# Patient Record
Sex: FEMALE | Race: WHITE | NOT HISPANIC OR LATINO
[De-identification: names, ages, dates, MRNs, and addresses within clinical notes are randomized per-mention and may not be internally consistent; named-entity substitution may affect disease eponyms.]

---

## 2021-12-07 PROBLEM — Z00.00 ENCOUNTER FOR PREVENTIVE HEALTH EXAMINATION: Status: ACTIVE | Noted: 2021-12-07

## 2022-01-03 ENCOUNTER — APPOINTMENT (OUTPATIENT)
Dept: GYNECOLOGIC ONCOLOGY | Facility: CLINIC | Age: 82
End: 2022-01-03
Payer: MEDICARE

## 2022-01-03 VITALS
WEIGHT: 122 LBS | TEMPERATURE: 97.2 F | SYSTOLIC BLOOD PRESSURE: 145 MMHG | BODY MASS INDEX: 20.58 KG/M2 | HEART RATE: 82 BPM | RESPIRATION RATE: 18 BRPM | DIASTOLIC BLOOD PRESSURE: 86 MMHG | HEIGHT: 64.5 IN | OXYGEN SATURATION: 94 %

## 2022-01-03 DIAGNOSIS — N94.89 OTHER SPECIFIED CONDITIONS ASSOCIATED WITH FEMALE GENITAL ORGANS AND MENSTRUAL CYCLE: ICD-10-CM

## 2022-01-03 DIAGNOSIS — Z80.42 FAMILY HISTORY OF MALIGNANT NEOPLASM OF PROSTATE: ICD-10-CM

## 2022-01-03 DIAGNOSIS — Z87.39 PERSONAL HISTORY OF OTHER DISEASES OF THE MUSCULOSKELETAL SYSTEM AND CONNECTIVE TISSUE: ICD-10-CM

## 2022-01-03 DIAGNOSIS — Z85.828 PERSONAL HISTORY OF OTHER MALIGNANT NEOPLASM OF SKIN: ICD-10-CM

## 2022-01-03 DIAGNOSIS — Z86.79 PERSONAL HISTORY OF OTHER DISEASES OF THE CIRCULATORY SYSTEM: ICD-10-CM

## 2022-01-03 DIAGNOSIS — N28.89 OTHER SPECIFIED DISORDERS OF KIDNEY AND URETER: ICD-10-CM

## 2022-01-03 PROCEDURE — 99204 OFFICE O/P NEW MOD 45 MIN: CPT

## 2022-01-03 RX ORDER — ZOLEDRONIC ACID 5 MG/100ML
INJECTION, SOLUTION INTRAVENOUS
Refills: 0 | Status: ACTIVE | COMMUNITY

## 2022-01-03 RX ORDER — APIXABAN 2.5 MG/1
2.5 TABLET, FILM COATED ORAL
Refills: 0 | Status: ACTIVE | COMMUNITY

## 2022-01-03 RX ORDER — ESCITALOPRAM OXALATE 10 MG/1
10 TABLET, FILM COATED ORAL
Refills: 0 | Status: ACTIVE | COMMUNITY

## 2022-01-03 NOTE — DISCUSSION/SUMMARY
[Reviewed Clinical Lab Test(s)] : Results of clinical tests were reviewed. [Reviewed Radiology Report(s)] : Radiology reports were reviewed. [Discuss Alternatives/Risks/Benefits w/Patient] : All alternatives, risks, and benefits were discussed with the patient/family and all questions were answered.  Patient expressed good understanding and appreciates the importance of follow up as recommended. [FreeTextEntry1] : 82 yo referred by Gyn Dr. Sloan for incidental finding of 4 cm L adnexal mass\par \par [ ] tumor markers today - Ca-125, CEA, Ca 19-9 \par [ ] ordering CT A/P \par [ ] Plan to monitor cyst, possible repeat TVUS in 3 months pending CT scan

## 2022-01-03 NOTE — HISTORY OF PRESENT ILLNESS
[FreeTextEntry1] : Problem\par 1) Adnexal mass\par \par Previous Therapy\par 1) MRI Abdomen with and without contrast 21\par    a) partially imaged 4.1x2.1cm oval well circumscribed L adenxal mass - may represent leiomyoma or an endometrioma\par 2) Pelvic US 10/25/21\par    a) L ovarian with complex hypoechoic mass 4.2cm with a round hyperechoic intramural structure 1cm\par \par 82 yo referred by Gyn Dr. Sloan for 4 cm L adnexal mass - dx includes leiomyoma versus endometrioma. Patient is asymptomatic, mass found incidentally on MRI 2021 done for renal mass. Per patient renal mass will be re-imaged, has been known for 5 years, appears benign. Denies any pelvic pain, vaginal bleeding, bloating, weight loss or gain. \par \par POB:  x2 \par PGYN: Denies hx of abnl pap smears, last many years ago.  Denies previous hx of ovarian cysts or fibroids. Menses began at age 10. Menopause at age 50. \par PMH: AFib s/p 3 ablations- told by cardiologist she still goes into Afib but cannot feel it, basal cell carcinoma R leg, anxiety, osteoporosis \par PSH: 3 cardiac ablations, right 5th digit sx, basal cell carcinoma excision \par Meds: Elliquis 2.5 mg BID, tramadol prn, escitalopram 10mg, ativan prn, reclast \par Allergies: color additives- mouth swelling and gum bleeding, has difficulty taking many mediations\par Fam Hx: father prostate cancer   \par Social Hx: denies alcohol, tobacco use. Lives at home alone, no stairs.   \par \par HCM:  \par Pap: last many years ago, always normal per patient \par Mammogram: last 2 years ago, always normal per patient \par Colonscopy: last 10 years, normal per patient   \par DXA: had 5 years ago- dx with osteoporosis

## 2022-01-03 NOTE — OB HISTORY
[Total Preg ___] : : [unfilled] [Full Term ___] : [unfilled] (full-term) [Vaginal ___] : [unfilled] vaginal delivery(s) [Menarche Age ____] : age at menarche was [unfilled] [Menopause  Age ____] : menopause occurred at age [unfilled]

## 2022-01-03 NOTE — PHYSICAL EXAM
[Chaperone Present] : A chaperone was present in the examining room during all aspects of the physical examination [Normal] : Recto-Vaginal Exam: Normal [Abnormal] : Adnexa(ae): Abnormal [Uterine Adnexae] : normal left adnexa [Adnexa Tenderness On The Left] : nontender on the left [___] : a [unfilled] ~Ucm left mass [FreeTextEntry1] : mobile, smooth ovarian mass on the left side [Fully active, able to carry on all pre-disease performance without restriction] : Status 0 - Fully active, able to carry on all pre-disease performance without restriction

## 2022-01-04 LAB
CANCER AG125 SERPL-ACNC: 15 U/ML
CANCER AG19-9 SERPL-ACNC: 13 U/ML
CEA SERPL-MCNC: 3 NG/ML

## 2022-01-18 ENCOUNTER — TRANSCRIPTION ENCOUNTER (OUTPATIENT)
Age: 82
End: 2022-01-18

## 2023-05-05 ENCOUNTER — RESULT REVIEW (OUTPATIENT)
Age: 83
End: 2023-05-05

## 2023-05-05 ENCOUNTER — APPOINTMENT (OUTPATIENT)
Dept: ORTHOPEDIC SURGERY | Facility: CLINIC | Age: 83
End: 2023-05-05
Payer: MEDICARE

## 2023-05-05 VITALS — WEIGHT: 124 LBS | BODY MASS INDEX: 20.91 KG/M2 | HEIGHT: 64.5 IN

## 2023-05-05 DIAGNOSIS — M17.0 BILATERAL PRIMARY OSTEOARTHRITIS OF KNEE: ICD-10-CM

## 2023-05-05 PROCEDURE — J3490M: CUSTOM | Mod: NC

## 2023-05-05 PROCEDURE — 20610 DRAIN/INJ JOINT/BURSA W/O US: CPT | Mod: 50

## 2023-05-05 NOTE — HISTORY OF PRESENT ILLNESS
[de-identified] : fell on knees 3 months ago\par occasional nsaids\par pain w stairs and getting up

## 2023-05-05 NOTE — PHYSICAL EXAM
[de-identified] : no effusion\par rom 0-125\par no pain w hip rom\par no instability\par nvid [de-identified] : mod OA both knees

## 2023-05-05 NOTE — DISCUSSION/SUMMARY
[de-identified] : arthritis exacerbation\par cortisone given both knees\par modalities reviewed\par fu when pain returns if soon will try visco

## 2023-05-05 NOTE — PROCEDURE
[de-identified] : patients wants a cortisone injection.  risks, benefits, and alternatives discussed and patient gave consent.  under sterile conditions using the anterolateral portal i injected 80mg of depomedrol and 2cc of 0.25% marcaine.  patient tolerated procedure well and will rest and ice and fu when injection wears off. \par \par both knees

## 2023-09-05 ENCOUNTER — NON-APPOINTMENT (OUTPATIENT)
Age: 83
End: 2023-09-05

## 2023-09-05 ENCOUNTER — APPOINTMENT (OUTPATIENT)
Dept: PAIN MANAGEMENT | Facility: CLINIC | Age: 83
End: 2023-09-05
Payer: MEDICARE

## 2023-09-05 VITALS
WEIGHT: 124 LBS | HEART RATE: 68 BPM | DIASTOLIC BLOOD PRESSURE: 83 MMHG | SYSTOLIC BLOOD PRESSURE: 145 MMHG | HEIGHT: 64.5 IN | OXYGEN SATURATION: 98 % | BODY MASS INDEX: 20.91 KG/M2

## 2023-09-05 PROCEDURE — 99203 OFFICE O/P NEW LOW 30 MIN: CPT

## 2023-09-05 RX ORDER — DICLOFENAC SODIUM 1% 10 MG/G
1 GEL TOPICAL
Qty: 1 | Refills: 1 | Status: ACTIVE | COMMUNITY
Start: 2023-09-05 | End: 1900-01-01

## 2023-09-05 NOTE — PHYSICAL EXAM
[de-identified] : General: Well-developed and well-nourished.  No acute distress. Psychiatric: Behavior was cooperative   Head:  Normocephalic and atraumatic Eyes:  Sclera white. Conjunctiva and eyelids pink and moist without discharge. Cardiovascular:  Regular Respiratory:  Trachea midline. Normal effort. No accessory muscle use with respiration Abdomen: Non distended, soft and nontender Skin:  No rashes, ulcers, or lesions appreciated.  Back  There is no pain with extension,   ROM wnl  Knee Joint Exam  ROM limited in flexion due to pain Tenderness at medial joint space, bilaterally  Anterior Drawer Test:  neg Lachman Test: neg Luis Manuel Test: neg Posterior Drawer Test neg:  [place] (Patellar Grind) Test:  POSITIVE  Comments:    Extremities: No edema  Musculoskeletal: Moving all extremities freely  Neuro: CN 2-12 Grossly intact Sensation to light touch is intact in all extremities Gait: Ambulates with no assistive device.

## 2023-09-05 NOTE — ASSESSMENT
[FreeTextEntry1] : Ms. JAZMYN WALKER is a 83 year PMHx as above, AFIB on ELIQUIS F suffering from bilateral knee pain, that based upon today's subjective complaints, physical examination, and XRAY review, is likely secondary to knee osteoarthritis  >> Medications  Chronic opioid use for non-malignant pain, in particular at high doses would not be recommended since it can potentially lead to hyperalgesia (hypersensitivity), tolerance and addiction.   Voltaren Gel PRN     >> Interventions   Arrange for bilateral knee viscosupplementation injections with Synvisc ONE under ULTRASOUND. I have discussed in detail with the patient that an interventional pain procedure is associated with potential risks.     >> Therapy and Other Modalities   PT 2x/week for 4 weeks for knee pain 2/2 chronic OA. Lower limb strengthening joseph. vastus medialis, hip abductors and hip external rotators, Closed chain, especially wall slides/squeezing therapy ball for VMO strengthening ,ROM/stretching- especially TFL/ITB,mobilization of lateral patellar retinaculum,  taping to correct patellar malalignment, joint protection techniques,Modalities as needed  >> Imaging and Other Studies  I have personally reviewed the images in detail together with the patient today, and I have answered all questions regarding this condition to the best of my ability, to the patient's satisfaction.  >> Consults  None ordered

## 2023-09-05 NOTE — REVIEW OF SYSTEMS
[Decreased ROM] : decreased range of motion [Weakness] : weakness [Negative] : Heme/Lymph [Joint Pain] : joint pain [FreeTextEntry9] : Both Knee

## 2023-09-05 NOTE — HISTORY OF PRESENT ILLNESS
[_______] : [unfilled] [___ mths] : [unfilled] month(s) ago [Paroxysmal] : paroxysmal [10] : a maximum pain level of 10/10 [Sharp] : sharp [Stabbing] : stabbing [Standing] : standing [Walking] : walking [Laying] : laying [Sitting] : sitting [Medications] : medications [Heat] : heat [Ice] : ice [5] : a current pain level of 5/10 [FreeTextEntry1] : HPI - MsNam WALKER is a 83 year F with PHx AFIB on ELIQUIS as below, referred by Dr Bonner who presents today with chief complaint of bilateral knee pain. Reports that it developed following a fall earlier this January. Since then has been having persistent knee pain. had imaging done.  It is located bilateral knees  there is radiation of the pain into the paterllar region.  The pain is presently 5/10 in severity on the numerical rating scale. It is sharp and aching in nature. The pain is not  constant. There is diurnal worsening, during the course of the day. The pain is aggravated with climbing stairs. The pain improves with rest. The pain is functionally and emotionally disabling for the patient as its preventing them from going about activities of daily living, such as routine housework. The pain does impair the patients ability to sleep.    Patient was NOT  been seen by pain management in the past   Patient attests to  6 weeks of provider directed treatment, including a provider directed stretching regimen.  Patient reports treatment that occurred within the last 3 months  Patient report that symptoms have been persistent and and progressing after treatment    Reports there is NO present numbness, there is NO weakness. Patient denies any bowel/bladder incontinence, no saddle/perineal anesthesia or any other red flag signs or symptoms. Reports regular BMs.      [FreeTextEntry7] : Patient present with both knee pain. [FreeTextEntry3] : stairs

## 2023-09-05 NOTE — DATA REVIEWED
[FreeTextEntry1] : Exam: XC KNEE BILATERAL Order#: [id number]    INDICATION: Bilateral knee pain  TECHNIQUE: 4 views of each knee  COMPARISON: 5/27/2021  FINDINGS:  There is no fracture or dislocation. There are mild degenerative changes of both knees. There is no focal soft tissue abnormality.    IMPRESSION:  Mild degenerative changes of both knees.  --- End of Report ---

## 2023-09-05 NOTE — CONSULT LETTER
[Dear  ___] : Dear ~HYUN, [Consult Letter:] : I had the pleasure of evaluating your patient, [unfilled]. [Please see my note below.] : Please see my note below. [Consult Closing:] : Thank you very much for allowing me to participate in the care of this patient.  If you have any questions, please do not hesitate to contact me. [Sincerely,] : Sincerely, [FreeTextEntry3] : Espinoza Watt DO

## 2023-10-03 ENCOUNTER — APPOINTMENT (OUTPATIENT)
Dept: PAIN MANAGEMENT | Facility: HOSPITAL | Age: 83
End: 2023-10-03

## 2023-10-03 ENCOUNTER — TRANSCRIPTION ENCOUNTER (OUTPATIENT)
Age: 83
End: 2023-10-03

## 2023-10-30 ENCOUNTER — APPOINTMENT (OUTPATIENT)
Dept: PAIN MANAGEMENT | Facility: CLINIC | Age: 83
End: 2023-10-30
Payer: MEDICARE

## 2023-10-30 VITALS
WEIGHT: 124 LBS | OXYGEN SATURATION: 99 % | SYSTOLIC BLOOD PRESSURE: 137 MMHG | BODY MASS INDEX: 20.91 KG/M2 | HEIGHT: 64.5 IN | DIASTOLIC BLOOD PRESSURE: 70 MMHG | HEART RATE: 68 BPM

## 2023-10-30 DIAGNOSIS — M17.4 OTHER BILATERAL SECONDARY OSTEOARTHRITIS OF KNEE: ICD-10-CM

## 2023-10-30 PROCEDURE — 99213 OFFICE O/P EST LOW 20 MIN: CPT

## 2025-01-02 ENCOUNTER — APPOINTMENT (OUTPATIENT)
Dept: GASTROENTEROLOGY | Facility: CLINIC | Age: 85
End: 2025-01-02
Payer: MEDICARE

## 2025-01-02 ENCOUNTER — APPOINTMENT (OUTPATIENT)
Dept: GASTROENTEROLOGY | Facility: CLINIC | Age: 85
End: 2025-01-02

## 2025-01-02 VITALS
WEIGHT: 122 LBS | HEART RATE: 89 BPM | BODY MASS INDEX: 20.62 KG/M2 | SYSTOLIC BLOOD PRESSURE: 148 MMHG | OXYGEN SATURATION: 98 % | DIASTOLIC BLOOD PRESSURE: 78 MMHG

## 2025-01-02 DIAGNOSIS — R19.4 CHANGE IN BOWEL HABIT: ICD-10-CM

## 2025-01-02 DIAGNOSIS — I48.0 PAROXYSMAL ATRIAL FIBRILLATION: ICD-10-CM

## 2025-01-02 DIAGNOSIS — Z12.11 ENCOUNTER FOR SCREENING FOR MALIGNANT NEOPLASM OF COLON: ICD-10-CM

## 2025-01-02 DIAGNOSIS — K59.00 CONSTIPATION, UNSPECIFIED: ICD-10-CM

## 2025-01-02 DIAGNOSIS — K64.9 UNSPECIFIED HEMORRHOIDS: ICD-10-CM

## 2025-01-02 PROCEDURE — 99205 OFFICE O/P NEW HI 60 MIN: CPT

## 2025-01-02 PROCEDURE — G2211 COMPLEX E/M VISIT ADD ON: CPT

## 2025-01-06 RX ORDER — LINACLOTIDE 72 UG/1
72 CAPSULE, GELATIN COATED ORAL
Qty: 90 | Refills: 0 | Status: ACTIVE | COMMUNITY
Start: 2025-01-02 | End: 1900-01-01

## 2025-04-28 ENCOUNTER — APPOINTMENT (OUTPATIENT)
Dept: GASTROENTEROLOGY | Facility: HOSPITAL | Age: 85
End: 2025-04-28

## 2025-05-19 ENCOUNTER — APPOINTMENT (OUTPATIENT)
Dept: PODIATRY | Facility: CLINIC | Age: 85
End: 2025-05-19
Payer: MEDICARE

## 2025-05-19 VITALS
BODY MASS INDEX: 18.61 KG/M2 | SYSTOLIC BLOOD PRESSURE: 130 MMHG | HEIGHT: 64 IN | WEIGHT: 109 LBS | DIASTOLIC BLOOD PRESSURE: 80 MMHG | TEMPERATURE: 97 F

## 2025-05-19 DIAGNOSIS — I70.91 GENERALIZED ATHEROSCLEROSIS: ICD-10-CM

## 2025-05-19 DIAGNOSIS — M79.671 PAIN IN RIGHT FOOT: ICD-10-CM

## 2025-05-19 DIAGNOSIS — G89.29 PAIN IN LEFT FOOT: ICD-10-CM

## 2025-05-19 DIAGNOSIS — M79.672 PAIN IN LEFT FOOT: ICD-10-CM

## 2025-05-19 DIAGNOSIS — M25.871 OTHER SPECIFIED JOINT DISORDERS, RIGHT ANKLE AND FOOT: ICD-10-CM

## 2025-05-19 DIAGNOSIS — M25.872 OTHER SPECIFIED JOINT DISORDERS, LEFT ANKLE AND FOOT: ICD-10-CM

## 2025-05-19 DIAGNOSIS — G89.29 PAIN IN RIGHT FOOT: ICD-10-CM

## 2025-05-19 DIAGNOSIS — L85.1 ACQUIRED KERATOSIS [KERATODERMA] PALMARIS ET PLANTARIS: ICD-10-CM

## 2025-05-19 PROCEDURE — 73630 X-RAY EXAM OF FOOT: CPT | Mod: RT

## 2025-05-19 PROCEDURE — 99203 OFFICE O/P NEW LOW 30 MIN: CPT | Mod: 25

## 2025-05-19 PROCEDURE — 11055 PARING/CUTG B9 HYPRKER LES 1: CPT

## 2025-06-30 ENCOUNTER — APPOINTMENT (OUTPATIENT)
Dept: PODIATRY | Facility: CLINIC | Age: 85
End: 2025-06-30
Payer: MEDICARE

## 2025-06-30 PROCEDURE — 99213 OFFICE O/P EST LOW 20 MIN: CPT | Mod: 25

## 2025-06-30 PROCEDURE — 11056 PARNG/CUTG B9 HYPRKR LES 2-4: CPT

## 2025-07-02 ENCOUNTER — APPOINTMENT (OUTPATIENT)
Dept: GASTROENTEROLOGY | Facility: CLINIC | Age: 85
End: 2025-07-02
Payer: MEDICARE

## 2025-07-02 VITALS
SYSTOLIC BLOOD PRESSURE: 130 MMHG | HEIGHT: 64 IN | OXYGEN SATURATION: 98 % | DIASTOLIC BLOOD PRESSURE: 62 MMHG | HEART RATE: 50 BPM | BODY MASS INDEX: 18.44 KG/M2 | WEIGHT: 108 LBS

## 2025-07-02 PROBLEM — R10.9 CHRONIC ABDOMINAL PAIN: Status: ACTIVE | Noted: 2025-07-02

## 2025-07-02 PROCEDURE — 99214 OFFICE O/P EST MOD 30 MIN: CPT

## 2025-07-02 PROCEDURE — G2211 COMPLEX E/M VISIT ADD ON: CPT

## 2025-07-15 ENCOUNTER — RESULT REVIEW (OUTPATIENT)
Age: 85
End: 2025-07-15

## 2025-08-11 ENCOUNTER — LABORATORY RESULT (OUTPATIENT)
Age: 85
End: 2025-08-11

## 2025-08-11 ENCOUNTER — APPOINTMENT (OUTPATIENT)
Dept: FAMILY MEDICINE | Facility: CLINIC | Age: 85
End: 2025-08-11

## 2025-08-11 VITALS
HEART RATE: 71 BPM | SYSTOLIC BLOOD PRESSURE: 120 MMHG | HEIGHT: 64 IN | WEIGHT: 108 LBS | BODY MASS INDEX: 18.44 KG/M2 | OXYGEN SATURATION: 98 % | DIASTOLIC BLOOD PRESSURE: 70 MMHG | TEMPERATURE: 99.6 F

## 2025-08-11 DIAGNOSIS — R53.83 OTHER FATIGUE: ICD-10-CM

## 2025-08-11 DIAGNOSIS — Z80.42 FAMILY HISTORY OF MALIGNANT NEOPLASM OF PROSTATE: ICD-10-CM

## 2025-08-11 PROCEDURE — G2211 COMPLEX E/M VISIT ADD ON: CPT

## 2025-08-11 PROCEDURE — 36415 COLL VENOUS BLD VENIPUNCTURE: CPT

## 2025-08-11 PROCEDURE — 99204 OFFICE O/P NEW MOD 45 MIN: CPT

## 2025-08-11 RX ORDER — LORAZEPAM 1 MG/1
1 TABLET ORAL
Refills: 0 | Status: ACTIVE | COMMUNITY

## 2025-08-11 RX ORDER — MULTIVITAMIN
CAPSULE ORAL
Refills: 0 | Status: ACTIVE | COMMUNITY

## 2025-08-11 RX ORDER — SERTRALINE HYDROCHLORIDE 150 MG/1
150 CAPSULE ORAL
Refills: 0 | Status: ACTIVE | COMMUNITY

## 2025-08-11 RX ORDER — CHOLECALCIFEROL (VITAMIN D3) 125 MCG
50 MCG TABLET ORAL
Refills: 0 | Status: ACTIVE | COMMUNITY

## 2025-08-12 LAB
25(OH)D3 SERPL-MCNC: 82.6 NG/ML
ALBUMIN SERPL ELPH-MCNC: 4.2 G/DL
ALP BLD-CCNC: 87 U/L
ALT SERPL-CCNC: 15 U/L
ANION GAP SERPL CALC-SCNC: 13 MMOL/L
AST SERPL-CCNC: 24 U/L
BASOPHILS # BLD AUTO: 0.04 K/UL
BASOPHILS NFR BLD AUTO: 0.7 %
BILIRUB SERPL-MCNC: 0.2 MG/DL
BUN SERPL-MCNC: 14 MG/DL
CALCIUM SERPL-MCNC: 9.5 MG/DL
CANCER AG125 SERPL-ACNC: 14 U/ML
CANCER AG19-9 SERPL-ACNC: 10 U/ML
CEA SERPL-MCNC: 3.2 NG/ML
CHLORIDE SERPL-SCNC: 102 MMOL/L
CO2 SERPL-SCNC: 24 MMOL/L
CREAT SERPL-MCNC: 0.64 MG/DL
EGFRCR SERPLBLD CKD-EPI 2021: 85 ML/MIN/1.73M2
EOSINOPHIL # BLD AUTO: 0.07 K/UL
EOSINOPHIL NFR BLD AUTO: 1.3 %
ESTIMATED AVERAGE GLUCOSE: 126 MG/DL
FERRITIN SERPL-MCNC: 34 NG/ML
FOLATE SERPL-MCNC: >20 NG/ML
GLUCOSE SERPL-MCNC: 85 MG/DL
HBA1C MFR BLD HPLC: 6 %
HCT VFR BLD CALC: 36.9 %
HGB BLD-MCNC: 11.9 G/DL
IMM GRANULOCYTES NFR BLD AUTO: 0.2 %
LYMPHOCYTES # BLD AUTO: 1.73 K/UL
LYMPHOCYTES NFR BLD AUTO: 32.4 %
MAN DIFF?: NORMAL
MCHC RBC-ENTMCNC: 30 PG
MCHC RBC-ENTMCNC: 32.2 G/DL
MCV RBC AUTO: 92.9 FL
MONOCYTES # BLD AUTO: 0.38 K/UL
MONOCYTES NFR BLD AUTO: 7.1 %
NEUTROPHILS # BLD AUTO: 3.11 K/UL
NEUTROPHILS NFR BLD AUTO: 58.3 %
PLATELET # BLD AUTO: 208 K/UL
POTASSIUM SERPL-SCNC: 4.2 MMOL/L
PROT SERPL-MCNC: 6.7 G/DL
RBC # BLD: 3.97 M/UL
RBC # FLD: 14.2 %
SODIUM SERPL-SCNC: 139 MMOL/L
TSH SERPL-ACNC: 1.95 UIU/ML
VIT B12 SERPL-MCNC: 684 PG/ML
WBC # FLD AUTO: 5.34 K/UL

## 2025-08-21 DIAGNOSIS — A69.20 LYME DISEASE, UNSPECIFIED: ICD-10-CM

## 2025-08-21 RX ORDER — DOXYCYCLINE HYCLATE 100 MG/1
100 TABLET, COATED ORAL TWICE DAILY
Qty: 20 | Refills: 0 | Status: ACTIVE | COMMUNITY
Start: 2025-08-21 | End: 1900-01-01